# Patient Record
Sex: FEMALE | Race: WHITE | NOT HISPANIC OR LATINO | URBAN - METROPOLITAN AREA
[De-identification: names, ages, dates, MRNs, and addresses within clinical notes are randomized per-mention and may not be internally consistent; named-entity substitution may affect disease eponyms.]

---

## 2017-05-23 ENCOUNTER — IMPORTED ENCOUNTER (OUTPATIENT)
Dept: URBAN - METROPOLITAN AREA CLINIC 50 | Facility: CLINIC | Age: 72
End: 2017-05-23

## 2017-05-23 NOTE — PATIENT DISCUSSION
"""OCT OU Today"" ""Follow ERM w/o surgery. Call if vision decreases or distortion increases. Recommend regular Amsler checks.  """

## 2018-04-10 ENCOUNTER — IMPORTED ENCOUNTER (OUTPATIENT)
Dept: URBAN - METROPOLITAN AREA CLINIC 50 | Facility: CLINIC | Age: 73
End: 2018-04-10

## 2018-05-22 ENCOUNTER — IMPORTED ENCOUNTER (OUTPATIENT)
Dept: URBAN - METROPOLITAN AREA CLINIC 50 | Facility: CLINIC | Age: 73
End: 2018-05-22

## 2019-05-21 ENCOUNTER — IMPORTED ENCOUNTER (OUTPATIENT)
Dept: URBAN - METROPOLITAN AREA CLINIC 50 | Facility: CLINIC | Age: 74
End: 2019-05-21

## 2020-05-26 ENCOUNTER — IMPORTED ENCOUNTER (OUTPATIENT)
Dept: URBAN - METROPOLITAN AREA CLINIC 50 | Facility: CLINIC | Age: 75
End: 2020-05-26

## 2020-10-08 NOTE — PATIENT DISCUSSION
HAVING DIF WITH BOTH EYES WORKING TOGETHER. [Family Member] : family member [Patient] : patient [FreeTextEntry1] : Dementia with behavioral disturbance, impaired gait unable to leave home.  right superior pubic fracture.   [FreeTextEntry2] : ELIAZAR HE is an 89 year with arthritis of bilateral lower extremities, dementia with behavioral disturbance, depression gastroesophageal reflux disease,CAD with PCI, hyperlipidemia, hypertonicity of bladder overactive bladder seen today for follow up visit\par \par On last visit no concerns reported.  Appetite is good.  Sometimes non-compliant with medications.  Patient would like to ambulate more and build up her endurance.  \par \par Currently family reports patient waking up at night.  Would like to try a sleeping pill.  No longer interested in physical therapy.  \par \par Patient/ patient's caregiver reports no weight loss >10lbs in the past 6 months. No changes in dentition or swallowing reported, No changes in hearing or vision reported. No changes in Cognition reported. Patient denies any symptoms of depression or anxiety. Patient is ADL and IADL dependent. No changes in gait or falls reported. \par Patient's home environment is safe. \par \par \par

## 2021-04-18 ASSESSMENT — VISUAL ACUITY
OS_CC: J1+
OD_CC: J1+
OD_CC: 20/30
OS_CC: 20/25
OS_CC: J1+
OD_CC: 20/25
OD_CC: J1+
OS_CC: 20/20
OD_CC: 20/30-
OS_CC: 20/30+2
OS_CC: 20/25-
OD_CC: 20/30+1

## 2021-04-18 ASSESSMENT — TONOMETRY
OS_IOP_MMHG: 17
OD_IOP_MMHG: 16
OS_IOP_MMHG: 16
OD_IOP_MMHG: 15
OD_IOP_MMHG: 16
OS_IOP_MMHG: 15
OD_IOP_MMHG: 17
OS_IOP_MMHG: 16

## 2021-07-20 ENCOUNTER — ANNUAL COMPREHENSIVE EXAM (OUTPATIENT)
Dept: URBAN - METROPOLITAN AREA CLINIC 50 | Facility: CLINIC | Age: 76
End: 2021-07-20

## 2021-07-20 DIAGNOSIS — H43.813: ICD-10-CM

## 2021-07-20 DIAGNOSIS — H35.371: ICD-10-CM

## 2021-07-20 DIAGNOSIS — H04.123: ICD-10-CM

## 2021-07-20 PROCEDURE — 92014 COMPRE OPH EXAM EST PT 1/>: CPT

## 2021-07-20 PROCEDURE — 92134 CPTRZ OPH DX IMG PST SGM RTA: CPT

## 2021-07-20 ASSESSMENT — TONOMETRY
OS_IOP_MMHG: 15
OD_IOP_MMHG: 16

## 2021-07-20 ASSESSMENT — VISUAL ACUITY
OS_CC: 20/20
OD_CC: 20/20
OU_CC: J1+
OU_CC: 20/20

## 2021-11-02 NOTE — PATIENT DISCUSSION
Patient DENIES any HEADACHE, JAW PAIN, NECK PAIN, SHOULDER PAIN, LOSS OF WEIGHT, OR LOSS OF APPETITE.

## 2021-11-02 NOTE — PATIENT DISCUSSION
STATES IN THE MORNING GETS ALTITUDINAL OBSCURATIONS, LASTS SECONDS. CHOLESTEROL 212 WITH . NOT TAKING ASA, TO SEE GP FOR CAROTID DOPPLERS AND POSSIBLE START ON ASA.

## 2021-11-04 NOTE — PATIENT DISCUSSION
11 4 21 1:29 pm  DR GALVEZ CALLED AND REQUESTED WE GET A SED AND CRP IF IT WAS NOT DONE, WHICH I INDICATED WE WOULD DO.

## 2022-03-14 NOTE — PATIENT DISCUSSION
Recommended OBSERVATION and MONITORING for change. [facility] Counseling: I discussed with the patient the risks of valacyclovir including but not limited to kidney damage, nausea, vomiting and severe allergy.  The patient understands that if the infection seems to be worsening or is not improving, they are to call.

## 2023-01-10 ENCOUNTER — COMPREHENSIVE EXAM (OUTPATIENT)
Dept: URBAN - METROPOLITAN AREA CLINIC 50 | Facility: CLINIC | Age: 78
End: 2023-01-10

## 2023-01-10 DIAGNOSIS — H52.4: ICD-10-CM

## 2023-01-10 DIAGNOSIS — H04.123: ICD-10-CM

## 2023-01-10 DIAGNOSIS — H43.813: ICD-10-CM

## 2023-01-10 PROCEDURE — 92015 DETERMINE REFRACTIVE STATE: CPT

## 2023-01-10 PROCEDURE — 92014 COMPRE OPH EXAM EST PT 1/>: CPT

## 2023-01-10 ASSESSMENT — TONOMETRY
OS_IOP_MMHG: 16
OD_IOP_MMHG: 16

## 2023-01-10 ASSESSMENT — VISUAL ACUITY
OU_CC: J1+@16"
OD_CC: 20/20-2
OU_CC: 20/20
OS_CC: 20/20-1

## 2024-02-08 ENCOUNTER — COMPREHENSIVE EXAM (OUTPATIENT)
Dept: URBAN - METROPOLITAN AREA CLINIC 52 | Facility: CLINIC | Age: 79
End: 2024-02-08

## 2024-02-08 DIAGNOSIS — H52.203: ICD-10-CM

## 2024-02-08 DIAGNOSIS — H04.123: ICD-10-CM

## 2024-02-08 DIAGNOSIS — H43.813: ICD-10-CM

## 2024-02-08 PROCEDURE — 92015 DETERMINE REFRACTIVE STATE: CPT

## 2024-02-08 PROCEDURE — 92014 COMPRE OPH EXAM EST PT 1/>: CPT

## 2024-02-08 ASSESSMENT — TONOMETRY
OD_IOP_MMHG: 17
OS_IOP_MMHG: 17

## 2024-02-08 ASSESSMENT — VISUAL ACUITY
OD_CC: 20/20-1
OS_CC: 20/20-1
OU_CC: 20/20

## 2025-02-26 ENCOUNTER — COMPREHENSIVE EXAM (OUTPATIENT)
Age: 80
End: 2025-02-26

## 2025-02-26 DIAGNOSIS — H04.123: ICD-10-CM

## 2025-02-26 DIAGNOSIS — H43.813: ICD-10-CM

## 2025-02-26 DIAGNOSIS — H35.371: ICD-10-CM

## 2025-02-26 PROCEDURE — 99214 OFFICE O/P EST MOD 30 MIN: CPT

## 2025-02-26 RX ORDER — LOTEPREDNOL ETABONATE 2 MG/ML
1 SUSPENSION/ DROPS OPHTHALMIC
Start: 2025-02-26

## 2025-04-23 ENCOUNTER — FOLLOW UP (OUTPATIENT)
Age: 80
End: 2025-04-23

## 2025-04-23 DIAGNOSIS — H01.114: ICD-10-CM

## 2025-04-23 DIAGNOSIS — H01.115: ICD-10-CM

## 2025-04-23 DIAGNOSIS — H04.123: ICD-10-CM

## 2025-04-23 DIAGNOSIS — H01.112: ICD-10-CM

## 2025-04-23 DIAGNOSIS — H01.111: ICD-10-CM

## 2025-04-23 PROCEDURE — 99212 OFFICE O/P EST SF 10 MIN: CPT

## 2025-04-23 RX ORDER — HYDROCORTISONE 0.5% 5 MG/G: CREAM TOPICAL TWICE A DAY

## 2025-05-14 ENCOUNTER — FOLLOW UP (OUTPATIENT)
Age: 80
End: 2025-05-14

## 2025-05-14 DIAGNOSIS — H01.115: ICD-10-CM

## 2025-05-14 DIAGNOSIS — H01.111: ICD-10-CM

## 2025-05-14 DIAGNOSIS — H01.114: ICD-10-CM

## 2025-05-14 DIAGNOSIS — H01.112: ICD-10-CM

## 2025-05-14 PROCEDURE — 99213 OFFICE O/P EST LOW 20 MIN: CPT
